# Patient Record
Sex: FEMALE | Race: WHITE | URBAN - METROPOLITAN AREA
[De-identification: names, ages, dates, MRNs, and addresses within clinical notes are randomized per-mention and may not be internally consistent; named-entity substitution may affect disease eponyms.]

---

## 2020-09-01 ENCOUNTER — OFFICE VISIT (OUTPATIENT)
Dept: URBAN - METROPOLITAN AREA CLINIC 19 | Facility: CLINIC | Age: 65
End: 2020-09-01
Payer: MEDICARE

## 2020-09-01 DIAGNOSIS — K51.20 ULCERATIVE PROCTITIS WITHOUT COMPLICATION: ICD-10-CM

## 2020-09-01 PROCEDURE — G8427 DOCREV CUR MEDS BY ELIG CLIN: HCPCS | Performed by: INTERNAL MEDICINE

## 2020-09-01 PROCEDURE — G9903 PT SCRN TBCO ID AS NON USER: HCPCS | Performed by: INTERNAL MEDICINE

## 2020-09-01 PROCEDURE — 3017F COLORECTAL CA SCREEN DOC REV: CPT | Performed by: INTERNAL MEDICINE

## 2020-09-01 PROCEDURE — 1036F TOBACCO NON-USER: CPT | Performed by: INTERNAL MEDICINE

## 2020-09-01 PROCEDURE — 99213 OFFICE O/P EST LOW 20 MIN: CPT | Performed by: INTERNAL MEDICINE

## 2020-09-01 PROCEDURE — G8420 CALC BMI NORM PARAMETERS: HCPCS | Performed by: INTERNAL MEDICINE

## 2020-09-01 PROCEDURE — 82306 VITAMIN D 25 HYDROXY: CPT | Performed by: INTERNAL MEDICINE

## 2020-09-01 RX ORDER — SPIRONOLACTONE 50 MG/1
TABLET, FILM COATED ORAL
Qty: 0 | Refills: 0 | Status: ACTIVE | COMMUNITY
Start: 1900-01-01

## 2020-09-01 RX ORDER — MESALAMINE 1.2 G/1
TAKE 4 TABLETS (4.8 GRAM) BY ORAL ROUTE ONCE DAILY WITH A MEAL FOR 90 DAYS TABLET, DELAYED RELEASE ORAL 1
Qty: 360 | Refills: 3 | Status: ACTIVE | COMMUNITY
Start: 2020-04-23 | End: 2021-04-18

## 2020-09-01 RX ORDER — MESALAMINE 1.2 G/1
4 CAPSULES TABLET, DELAYED RELEASE ORAL ONCE A DAY
Qty: 360 CAPSULE | Refills: 3
Start: 2020-01-08 | End: 2021-01-02

## 2020-09-01 RX ORDER — METOPROLOL TARTRATE 50 MG/1
TABLET, FILM COATED ORAL
Qty: 0 | Refills: 0 | Status: ACTIVE | COMMUNITY
Start: 1900-01-01

## 2020-09-01 RX ORDER — ZOLPIDEM TARTRATE 5 MG/1
TABLET, FILM COATED ORAL
Qty: 0 | Refills: 0 | Status: ACTIVE | COMMUNITY
Start: 1900-01-01

## 2020-09-01 RX ORDER — MESALAMINE 1.2 G/1
TAKE 4 TABLETS BY ORAL ROUTE DAILY FOR 90 DAYS TABLET, DELAYED RELEASE ORAL 1
Qty: 360 | Refills: 3 | Status: ACTIVE | COMMUNITY
Start: 2020-01-08 | End: 2021-01-02

## 2020-09-01 NOTE — HPI-TODAY'S VISIT:
9/5/19: Patient has had intermittent rectal bleeding, so she saw Dr. Lenny Pisano, who performed a colonoscopy on 7/5/19. This revealed inflammation of the rectum, melanosis coli, and small internal hemorrhoids. Biopsies confirmed the presence of chronic, active inflammation consistent with ulcerative proctitis. She was prescribed mesalamine 1.2 g (4 capsules daily = 4.8 g) when she saw Dr. Pisano in clinic on 7/23/19. Now she is constipated - no further diarrhea or blood in stool. She has some vague rectal discomfort. We discussed the natural history of the disease and its management today.   3/5/20: Patient had been doing relatively well on Lialda 2.4 g/day; however, she recently had to increase her Lialda to 4.8 g/day due to increase bowel frequency and abdominal cramping. No blood in stool. She usually has 1-2 BMs/day now but occasionally has 5 to 6 BMs/day. We talked about other treatment options for ulcerative proctitis today, including immunosuppressants and biologics.

## 2020-09-02 LAB
A/G RATIO: 2.6
ALBUMIN: 4.9
ALKALINE PHOSPHATASE: 87
ALT (SGPT): 15
AST (SGOT): 16
BILIRUBIN, TOTAL: 0.3
BUN/CREATININE RATIO: 20
BUN: 17
C-REACTIVE PROTEIN, QUANT: 1
CALCIUM: 10.5
CARBON DIOXIDE, TOTAL: 25
CHLORIDE: 99
CREATININE: 0.86
EGFR IF AFRICN AM: 82
EGFR IF NONAFRICN AM: 71
GLOBULIN, TOTAL: 1.9
GLUCOSE: 87
HEMATOCRIT: 43.1
HEMOGLOBIN: 14.5
MCH: 30.1
MCHC: 33.6
MCV: 89
NRBC: (no result)
PLATELETS: 251
POTASSIUM: 4.7
PROTEIN, TOTAL: 6.8
RBC: 4.82
RDW: 12.6
SEDIMENTATION RATE-WESTERGREN: 7
SODIUM: 140
VITAMIN D, 25-HYDROXY: 98.6
WBC: 6.8

## 2021-03-02 ENCOUNTER — OFFICE VISIT (OUTPATIENT)
Dept: URBAN - METROPOLITAN AREA CLINIC 19 | Facility: CLINIC | Age: 66
End: 2021-03-02
Payer: MEDICARE

## 2021-03-02 ENCOUNTER — LAB OUTSIDE AN ENCOUNTER (OUTPATIENT)
Dept: URBAN - METROPOLITAN AREA CLINIC 19 | Facility: CLINIC | Age: 66
End: 2021-03-02

## 2021-03-02 VITALS
DIASTOLIC BLOOD PRESSURE: 71 MMHG | TEMPERATURE: 97.4 F | SYSTOLIC BLOOD PRESSURE: 149 MMHG | WEIGHT: 132.4 LBS | HEIGHT: 67 IN | BODY MASS INDEX: 20.78 KG/M2 | HEART RATE: 54 BPM

## 2021-03-02 DIAGNOSIS — K51.20 ULCERATIVE PROCTITIS WITHOUT COMPLICATION: ICD-10-CM

## 2021-03-02 PROCEDURE — 99213 OFFICE O/P EST LOW 20 MIN: CPT | Performed by: INTERNAL MEDICINE

## 2021-03-02 RX ORDER — METOPROLOL TARTRATE 50 MG/1
TABLET, FILM COATED ORAL
Qty: 0 | Refills: 0 | Status: ACTIVE | COMMUNITY
Start: 1900-01-01

## 2021-03-02 RX ORDER — SODIUM, POTASSIUM,MAG SULFATES 17.5-3.13G
254 ML SOLUTION, RECONSTITUTED, ORAL ORAL ONCE
Qty: 1 KIT | Refills: 0 | OUTPATIENT
Start: 2021-03-02 | End: 2021-03-03

## 2021-03-02 RX ORDER — ZOLPIDEM TARTRATE 5 MG/1
TABLET, FILM COATED ORAL
Qty: 0 | Refills: 0 | Status: ACTIVE | COMMUNITY
Start: 1900-01-01

## 2021-03-02 RX ORDER — MESALAMINE 1.2 G/1
TAKE 4 TABLETS (4.8 GRAM) BY ORAL ROUTE ONCE DAILY WITH A MEAL FOR 90 DAYS TABLET, DELAYED RELEASE ORAL 1
Qty: 360 | Refills: 3 | Status: ACTIVE | COMMUNITY
Start: 2020-04-23 | End: 2021-04-18

## 2021-03-02 RX ORDER — SPIRONOLACTONE 50 MG/1
TABLET, FILM COATED ORAL
Qty: 0 | Refills: 0 | Status: ACTIVE | COMMUNITY
Start: 1900-01-01

## 2021-03-02 NOTE — HPI-TODAY'S VISIT:
9/5/19: Patient has had intermittent rectal bleeding, so she saw Dr. Lenny Pisano, who performed a colonoscopy on 7/5/19. This revealed inflammation of the rectum, melanosis coli, and small internal hemorrhoids. Biopsies confirmed the presence of chronic, active inflammation consistent with ulcerative proctitis. She was prescribed mesalamine 1.2 g (4 capsules daily = 4.8 g) when she saw Dr. Pisano in clinic on 7/23/19. Now she is constipated - no further diarrhea or blood in stool. She has some vague rectal discomfort. We discussed the natural history of the disease and its management today.   3/5/20: Patient had been doing relatively well on Lialda 2.4 g/day; however, she recently had to increase her Lialda to 4.8 g/day due to increase bowel frequency and abdominal cramping. No blood in stool. She usually has 1-2 BMs/day now but occasionally has 5 to 6 BMs/day. We talked about other treatment options for ulcerative proctitis today, including immunosuppressants and biologics.  3/2/21:  Patient feels well today.  On Lialda 4.8 g/day with good results.  Tried titrating down, but then she would have LLQ pain and bloating.  No blood in stool.  Has never had a colonoscopy by me - agreed to have one this year.

## 2021-03-03 LAB
A/G RATIO: 2.4
ALBUMIN: 4.8
ALKALINE PHOSPHATASE: 92
ALT (SGPT): 10
AST (SGOT): 15
BILIRUBIN, TOTAL: 0.4
BUN/CREATININE RATIO: 17
BUN: 14
C-REACTIVE PROTEIN, QUANT: 1
CALCIUM: 10.3
CARBON DIOXIDE, TOTAL: 27
CHLORIDE: 100
CREATININE: 0.82
EGFR IF AFRICN AM: 86
EGFR IF NONAFRICN AM: 75
GLOBULIN, TOTAL: 2
GLUCOSE: 83
HEMATOCRIT: 42.5
HEMOGLOBIN: 14.5
MCH: 31.6
MCHC: 34.1
MCV: 93
NRBC: (no result)
PLATELETS: 262
POTASSIUM: 4.9
PROTEIN, TOTAL: 6.8
RBC: 4.59
RDW: 12.1
SODIUM: 140
VITAMIN B12: 651
VITAMIN D, 25-HYDROXY: 78.4
WBC: 7.5

## 2021-03-15 PROBLEM — 64766004 ULCERATIVE COLITIS: Status: ACTIVE | Noted: 2021-03-15

## 2021-04-12 ENCOUNTER — TELEPHONE ENCOUNTER (OUTPATIENT)
Dept: URBAN - METROPOLITAN AREA CLINIC 19 | Facility: CLINIC | Age: 66
End: 2021-04-12

## 2021-04-12 ENCOUNTER — OFFICE VISIT (OUTPATIENT)
Dept: URBAN - METROPOLITAN AREA LAB 2 | Facility: LAB | Age: 66
End: 2021-04-12
Payer: MEDICARE

## 2021-04-12 DIAGNOSIS — D12.5 ADENOMA OF SIGMOID COLON: ICD-10-CM

## 2021-04-12 DIAGNOSIS — D12.3 ADENOMA OF TRANSVERSE COLON: ICD-10-CM

## 2021-04-12 DIAGNOSIS — D12.2 ADENOMA OF ASCENDING COLON: ICD-10-CM

## 2021-04-12 DIAGNOSIS — K63.89 BACTERIAL OVERGROWTH SYNDROME: ICD-10-CM

## 2021-04-12 DIAGNOSIS — K51.20 CHRONIC ULCERATIVE PROCTITIS: ICD-10-CM

## 2021-04-12 PROCEDURE — 45385 COLONOSCOPY W/LESION REMOVAL: CPT | Performed by: INTERNAL MEDICINE

## 2021-04-12 PROCEDURE — 45380 COLONOSCOPY AND BIOPSY: CPT | Performed by: INTERNAL MEDICINE

## 2021-04-12 RX ORDER — MESALAMINE 1.2 G/1
TAKE 4 TABLETS (4.8 GRAM) BY ORAL ROUTE ONCE DAILY WITH A MEAL FOR 90 DAYS TABLET, DELAYED RELEASE ORAL 1
Qty: 360 | Refills: 3 | Status: ACTIVE | COMMUNITY
Start: 2020-04-23 | End: 2021-04-18

## 2021-04-12 RX ORDER — SPIRONOLACTONE 50 MG/1
TABLET, FILM COATED ORAL
Qty: 0 | Refills: 0 | Status: ACTIVE | COMMUNITY
Start: 1900-01-01

## 2021-04-12 RX ORDER — ZOLPIDEM TARTRATE 5 MG/1
TABLET, FILM COATED ORAL
Qty: 0 | Refills: 0 | Status: ACTIVE | COMMUNITY
Start: 1900-01-01

## 2021-04-12 RX ORDER — METOPROLOL TARTRATE 50 MG/1
TABLET, FILM COATED ORAL
Qty: 0 | Refills: 0 | Status: ACTIVE | COMMUNITY
Start: 1900-01-01

## 2021-06-09 ENCOUNTER — OFFICE VISIT (OUTPATIENT)
Dept: URBAN - METROPOLITAN AREA CLINIC 19 | Facility: CLINIC | Age: 66
End: 2021-06-09
Payer: MEDICARE

## 2021-06-09 VITALS
DIASTOLIC BLOOD PRESSURE: 75 MMHG | HEART RATE: 68 BPM | BODY MASS INDEX: 20.03 KG/M2 | HEIGHT: 67 IN | WEIGHT: 127.6 LBS | SYSTOLIC BLOOD PRESSURE: 118 MMHG | TEMPERATURE: 98.2 F

## 2021-06-09 DIAGNOSIS — K51.20 ULCERATIVE PROCTITIS: ICD-10-CM

## 2021-06-09 DIAGNOSIS — Z86.010 HISTORY OF COLON POLYPS: ICD-10-CM

## 2021-06-09 PROBLEM — 428283002 HISTORY OF POLYP OF COLON: Status: ACTIVE | Noted: 2021-06-09

## 2021-06-09 PROCEDURE — 99213 OFFICE O/P EST LOW 20 MIN: CPT | Performed by: INTERNAL MEDICINE

## 2021-06-09 RX ORDER — SPIRONOLACTONE 50 MG/1
TABLET, FILM COATED ORAL
Qty: 0 | Refills: 0 | Status: ACTIVE | COMMUNITY
Start: 1900-01-01

## 2021-06-09 RX ORDER — MESALAMINE 1.2 G/1
4 TABLETS TABLET, DELAYED RELEASE ORAL ONCE A DAY
Qty: 360 TABLET | Refills: 3 | OUTPATIENT
Start: 2021-06-09 | End: 2022-06-04

## 2021-06-09 RX ORDER — METOPROLOL TARTRATE 50 MG/1
TABLET, FILM COATED ORAL
Qty: 0 | Refills: 0 | Status: ACTIVE | COMMUNITY
Start: 1900-01-01

## 2021-06-09 RX ORDER — ZOLPIDEM TARTRATE 5 MG/1
TABLET, FILM COATED ORAL
Qty: 0 | Refills: 0 | Status: ACTIVE | COMMUNITY
Start: 1900-01-01

## 2021-06-09 NOTE — HPI-TODAY'S VISIT:
9/5/19: Patient has had intermittent rectal bleeding, so she saw Dr. Lenny Pisano, who performed a colonoscopy on 7/5/19. This revealed inflammation of the rectum, melanosis coli, and small internal hemorrhoids. Biopsies confirmed the presence of chronic, active inflammation consistent with ulcerative proctitis. She was prescribed mesalamine 1.2 g (4 capsules daily = 4.8 g) when she saw Dr. Pisano in clinic on 7/23/19. Now she is constipated - no further diarrhea or blood in stool. She has some vague rectal discomfort. We discussed the natural history of the disease and its management today.   3/5/20: Patient had been doing relatively well on Lialda 2.4 g/day; however, she recently had to increase her Lialda to 4.8 g/day due to increase bowel frequency and abdominal cramping. No blood in stool. She usually has 1-2 BMs/day now but occasionally has 5 to 6 BMs/day. We talked about other treatment options for ulcerative proctitis today, including immunosuppressants and biologics.  3/2/21:  Patient feels well today.  On Lialda 4.8 g/day with good results.  Tried titrating down, but then she would have LLQ pain and bloating.  No blood in stool.  Has never had a colonoscopy by me - agreed to have one this year.  6/9/21:  Patient presents for follow up after I repeated her colonoscopy on 4/12/21.  This showed 6 polyps, some of which were sessile serrated adenomas.  She had some erythematous mucosa in the right colon, but biopsies showed only hemorrhage, not inflammation.  Biopsies from the rectum were unremarkable.  Patient feels fine today - no abdominal pain, diarrhea, or rectal bleeding.

## 2021-09-07 ENCOUNTER — OFFICE VISIT (OUTPATIENT)
Dept: URBAN - METROPOLITAN AREA CLINIC 19 | Facility: CLINIC | Age: 66
End: 2021-09-07

## 2022-05-16 ENCOUNTER — WEB ENCOUNTER (OUTPATIENT)
Dept: URBAN - METROPOLITAN AREA CLINIC 19 | Facility: CLINIC | Age: 67
End: 2022-05-16

## 2022-05-16 ENCOUNTER — OFFICE VISIT (OUTPATIENT)
Dept: URBAN - METROPOLITAN AREA CLINIC 19 | Facility: CLINIC | Age: 67
End: 2022-05-16
Payer: MEDICARE

## 2022-05-16 DIAGNOSIS — K51.20 ULCERATIVE PROCTITIS: ICD-10-CM

## 2022-05-16 PROCEDURE — 99214 OFFICE O/P EST MOD 30 MIN: CPT | Performed by: INTERNAL MEDICINE

## 2022-05-16 RX ORDER — METOPROLOL TARTRATE 50 MG/1
TABLET, FILM COATED ORAL
Qty: 0 | Refills: 0 | Status: ACTIVE | COMMUNITY
Start: 1900-01-01

## 2022-05-16 RX ORDER — ZOLPIDEM TARTRATE 5 MG/1
TABLET, FILM COATED ORAL
Qty: 0 | Refills: 0 | Status: ACTIVE | COMMUNITY
Start: 1900-01-01

## 2022-05-16 RX ORDER — SPIRONOLACTONE 50 MG/1
TABLET, FILM COATED ORAL
Qty: 0 | Refills: 0 | Status: ACTIVE | COMMUNITY
Start: 1900-01-01

## 2022-05-16 RX ORDER — MESALAMINE 1.2 G/1
4 TABLETS TABLET, DELAYED RELEASE ORAL ONCE A DAY
Qty: 360 | Refills: 3
Start: 2021-06-09 | End: 2023-05-11

## 2022-05-16 RX ORDER — MESALAMINE 1.2 G/1
4 TABLETS TABLET, DELAYED RELEASE ORAL ONCE A DAY
Qty: 360 TABLET | Refills: 3 | Status: ACTIVE | COMMUNITY
Start: 2021-06-09 | End: 2022-06-04

## 2022-05-16 NOTE — HPI-TODAY'S VISIT:
9/5/19: Patient has had intermittent rectal bleeding, so she saw Dr. Lenny Pisano, who performed a colonoscopy on 7/5/19. This revealed inflammation of the rectum, melanosis coli, and small internal hemorrhoids. Biopsies confirmed the presence of chronic, active inflammation consistent with ulcerative proctitis. She was prescribed mesalamine 1.2 g (4 capsules daily = 4.8 g) when she saw Dr. Pisano in clinic on 7/23/19. Now she is constipated - no further diarrhea or blood in stool. She has some vague rectal discomfort. We discussed the natural history of the disease and its management today.   3/5/20: Patient had been doing relatively well on Lialda 2.4 g/day; however, she recently had to increase her Lialda to 4.8 g/day due to increase bowel frequency and abdominal cramping. No blood in stool. She usually has 1-2 BMs/day now but occasionally has 5 to 6 BMs/day. We talked about other treatment options for ulcerative proctitis today, including immunosuppressants and biologics.  3/2/21:  Patient feels well today.  On Lialda 4.8 g/day with good results.  Tried titrating down, but then she would have LLQ pain and bloating.  No blood in stool.  Has never had a colonoscopy by me - agreed to have one this year.  6/9/21:  Patient presents for follow up after I repeated her colonoscopy on 4/12/21.  This showed 6 polyps, some of which were sessile serrated adenomas.  She had some erythematous mucosa in the right colon, but biopsies showed only hemorrhage, not inflammation.  Biopsies from the rectum were unremarkable.  Patient feels fine today - no abdominal pain, diarrhea, or rectal bleeding.  5/16/22:  Patient presents for follow-up of her ulcerative proctitis.  She has not managed to decrease her Lialda to 2 capsules daily, but she has decreased them to 3 capsules daily.  If she goes down to 2 capsules, she gets bloating/cramping.  Today, she has no complaints.  We agreed that I should repeat her colonoscopy to reevaluate her colon

## 2022-05-17 LAB
A/G RATIO: 2.5
ALBUMIN: 4.8
ALKALINE PHOSPHATASE: 95
ALT (SGPT): 16
AST (SGOT): 18
BILIRUBIN, TOTAL: 0.4
BUN/CREATININE RATIO: 22
BUN: 16
C-REACTIVE PROTEIN, QUANT: 1
CALCIUM: 10.2
CARBON DIOXIDE, TOTAL: 23
CHLORIDE: 100
CREATININE: 0.72
EGFR: 92
GLOBULIN, TOTAL: 1.9
GLUCOSE: 96
HEMATOCRIT: 45.1
HEMOGLOBIN: 14.2
MCH: 29.6
MCHC: 31.5
MCV: 94
NRBC: (no result)
PLATELETS: 268
POTASSIUM: 4.8
PROTEIN, TOTAL: 6.7
RBC: 4.8
RDW: 12.1
SEDIMENTATION RATE-WESTERGREN: 2
SODIUM: 141
VITAMIN B12: 503
VITAMIN D, 25-HYDROXY: 66.7
WBC: 10.7

## 2022-06-07 ENCOUNTER — OFFICE VISIT (OUTPATIENT)
Dept: URBAN - METROPOLITAN AREA CLINIC 19 | Facility: CLINIC | Age: 67
End: 2022-06-07

## 2023-05-19 ENCOUNTER — LAB OUTSIDE AN ENCOUNTER (OUTPATIENT)
Dept: URBAN - METROPOLITAN AREA CLINIC 19 | Facility: CLINIC | Age: 68
End: 2023-05-19

## 2023-05-19 ENCOUNTER — OFFICE VISIT (OUTPATIENT)
Dept: URBAN - METROPOLITAN AREA CLINIC 19 | Facility: CLINIC | Age: 68
End: 2023-05-19
Payer: MEDICARE

## 2023-05-19 ENCOUNTER — DASHBOARD ENCOUNTERS (OUTPATIENT)
Age: 68
End: 2023-05-19

## 2023-05-19 VITALS
WEIGHT: 132.6 LBS | HEIGHT: 67 IN | TEMPERATURE: 98.8 F | DIASTOLIC BLOOD PRESSURE: 64 MMHG | BODY MASS INDEX: 20.81 KG/M2 | SYSTOLIC BLOOD PRESSURE: 132 MMHG

## 2023-05-19 DIAGNOSIS — R19.8 ALTERNATING CONSTIPATION AND DIARRHEA: ICD-10-CM

## 2023-05-19 DIAGNOSIS — R13.19 ESOPHAGEAL DYSPHAGIA: ICD-10-CM

## 2023-05-19 DIAGNOSIS — K51.20 ULCERATIVE PROCTITIS: ICD-10-CM

## 2023-05-19 DIAGNOSIS — Z86.010 HISTORY OF COLON POLYPS: ICD-10-CM

## 2023-05-19 DIAGNOSIS — R13.12 OROPHARYNGEAL DYSPHAGIA: ICD-10-CM

## 2023-05-19 DIAGNOSIS — R12 HEARTBURN: ICD-10-CM

## 2023-05-19 PROBLEM — 71457002: Status: ACTIVE | Noted: 2023-05-19

## 2023-05-19 PROCEDURE — 99214 OFFICE O/P EST MOD 30 MIN: CPT | Performed by: INTERNAL MEDICINE

## 2023-05-19 RX ORDER — SPIRONOLACTONE 50 MG/1
TABLET, FILM COATED ORAL
Qty: 0 | Refills: 0 | Status: ACTIVE | COMMUNITY
Start: 1900-01-01

## 2023-05-19 RX ORDER — SODIUM, POTASSIUM,MAG SULFATES 17.5-3.13G
177ML SOLUTION, RECONSTITUTED, ORAL ORAL
Qty: 1 | OUTPATIENT
Start: 2023-05-19 | End: 2023-05-21

## 2023-05-19 RX ORDER — MESALAMINE 1.2 G/1
4 TABLETS TABLET, DELAYED RELEASE ORAL ONCE A DAY
Qty: 360 | Refills: 3
Start: 2021-06-09 | End: 2024-05-13

## 2023-05-19 RX ORDER — METOPROLOL TARTRATE 50 MG/1
TABLET, FILM COATED ORAL
Qty: 0 | Refills: 0 | Status: ACTIVE | COMMUNITY
Start: 1900-01-01

## 2023-05-19 RX ORDER — ZOLPIDEM TARTRATE 5 MG/1
TABLET, FILM COATED ORAL
Qty: 0 | Refills: 0 | Status: ACTIVE | COMMUNITY
Start: 1900-01-01

## 2023-05-19 NOTE — HPI-TODAY'S VISIT:
9/5/19: Patient has had intermittent rectal bleeding, so she saw Dr. Lenny Pisano, who performed a colonoscopy on 7/5/19. This revealed inflammation of the rectum, melanosis coli, and small internal hemorrhoids. Biopsies confirmed the presence of chronic, active inflammation consistent with ulcerative proctitis. She was prescribed mesalamine 1.2 g (4 capsules daily = 4.8 g) when she saw Dr. Pisano in clinic on 7/23/19. Now she is constipated - no further diarrhea or blood in stool. She has some vague rectal discomfort. We discussed the natural history of the disease and its management today.   3/5/20: Patient had been doing relatively well on Lialda 2.4 g/day; however, she recently had to increase her Lialda to 4.8 g/day due to increase bowel frequency and abdominal cramping. No blood in stool. She usually has 1-2 BMs/day now but occasionally has 5 to 6 BMs/day. We talked about other treatment options for ulcerative proctitis today, including immunosuppressants and biologics.  3/2/21:  Patient feels well today.  On Lialda 4.8 g/day with good results.  Tried titrating down, but then she would have LLQ pain and bloating.  No blood in stool.  Has never had a colonoscopy by me - agreed to have one this year.  6/9/21:  Patient presents for follow up after I repeated her colonoscopy on 4/12/21.  This showed 6 polyps, some of which were sessile serrated adenomas.  She had some erythematous mucosa in the right colon, but biopsies showed only hemorrhage, not inflammation.  Biopsies from the rectum were unremarkable.  Patient feels fine today - no abdominal pain, diarrhea, or rectal bleeding.  5/16/22:  Patient presents for follow-up of her ulcerative proctitis.  She has not managed to decrease her Lialda to 2 capsules daily, but she has decreased them to 3 capsules daily.  If she goes down to 2 capsules, she gets bloating/cramping.  Today, she has no complaints.  We agreed that I should repeat her colonoscopy to reevaluate her colon.  5/19/23: Patient returns for reevaluation of her ulcerative proctitis. I performed her last colonoscopy on 5/16/22 and found evidence of quiescent ulcerative proctitis. She takes Lialda 3.6 g/day since she states that this is the dose that prevents her from having diarrhea or constipation. She states that even at this dose, she has occasional fecal impactions - she takes MiraLax and senna occasionally. She also complains of some dysphagia - she states that she sometimes get "choked" with food and feels a "tightness" in her upper abdomen.

## 2023-05-21 LAB
A/G RATIO: 2.1
ALBUMIN: 4.6
ALKALINE PHOSPHATASE: 81
ALT (SGPT): 11
AST (SGOT): 13
BILIRUBIN, TOTAL: 0.4
BUN/CREATININE RATIO: (no result)
BUN: 16
C-REACTIVE PROTEIN, QUANT: 1.9
CALCIUM: 10
CARBON DIOXIDE, TOTAL: 28
CHLORIDE: 105
CREATININE: 0.75
EGFR: 87
GLOBULIN, TOTAL: 2.2
GLUCOSE: 71
HEMATOCRIT: 42.4
HEMOGLOBIN: 14.8
MCH: 30.7
MCHC: 34.9
MCV: 88
MPV: 10.5
PLATELET COUNT: 259
POTASSIUM: 4.6
PROTEIN, TOTAL: 6.8
RDW: 11.8
RED BLOOD CELL COUNT: 4.82
SED RATE BY MODIFIED: 2
SODIUM: 140
VITAMIN B12: 376
VITAMIN D,25-OH,TOTAL,IA: 67
WHITE BLOOD CELL COUNT: 9.4

## 2023-05-31 ENCOUNTER — ERX REFILL RESPONSE (OUTPATIENT)
Dept: URBAN - METROPOLITAN AREA CLINIC 19 | Facility: CLINIC | Age: 68
End: 2023-05-31

## 2023-05-31 RX ORDER — MESALAMINE 1.2 G/1
4 TABLETS TABLET, DELAYED RELEASE ORAL ONCE A DAY
Qty: 360 | Refills: 3 | OUTPATIENT

## 2023-05-31 RX ORDER — MESALAMINE 1.2 G/1
TAKE 4 TABLETS BY MOUTH ONCE A DAY TABLET, DELAYED RELEASE ORAL
Qty: 360 TABLET | Refills: 3 | OUTPATIENT

## 2023-07-24 ENCOUNTER — OFFICE VISIT (OUTPATIENT)
Dept: URBAN - METROPOLITAN AREA SURGERY CENTER 31 | Facility: SURGERY CENTER | Age: 68
End: 2023-07-24
Payer: MEDICARE

## 2023-07-24 ENCOUNTER — CLAIMS CREATED FROM THE CLAIM WINDOW (OUTPATIENT)
Dept: URBAN - METROPOLITAN AREA CLINIC 4 | Facility: CLINIC | Age: 68
End: 2023-07-24
Payer: MEDICARE

## 2023-07-24 DIAGNOSIS — K51.20 ACUTE ULCERATIVE PROCTITIS: ICD-10-CM

## 2023-07-24 DIAGNOSIS — D12.0 ADENOMA OF CECUM: ICD-10-CM

## 2023-07-24 DIAGNOSIS — R13.10 ABNORMAL DEGLUTITION: ICD-10-CM

## 2023-07-24 DIAGNOSIS — K31.89 ACQUIRED DEFORMITY OF DUODENUM: ICD-10-CM

## 2023-07-24 DIAGNOSIS — K29.70 GASTRITIS, UNSPECIFIED, WITHOUT BLEEDING: ICD-10-CM

## 2023-07-24 DIAGNOSIS — K63.89 APPENDICITIS EPIPLOICA: ICD-10-CM

## 2023-07-24 PROCEDURE — 88305 TISSUE EXAM BY PATHOLOGIST: CPT | Performed by: PATHOLOGY

## 2023-07-24 PROCEDURE — 43239 EGD BIOPSY SINGLE/MULTIPLE: CPT | Performed by: INTERNAL MEDICINE

## 2023-07-24 PROCEDURE — G8907 PT DOC NO EVENTS ON DISCHARG: HCPCS | Performed by: INTERNAL MEDICINE

## 2023-07-24 PROCEDURE — 45385 COLONOSCOPY W/LESION REMOVAL: CPT | Performed by: INTERNAL MEDICINE

## 2023-07-24 PROCEDURE — 43248 EGD GUIDE WIRE INSERTION: CPT | Performed by: INTERNAL MEDICINE

## 2023-07-24 PROCEDURE — 45380 COLONOSCOPY AND BIOPSY: CPT | Performed by: INTERNAL MEDICINE

## 2023-07-24 PROCEDURE — 88342 IMHCHEM/IMCYTCHM 1ST ANTB: CPT | Performed by: PATHOLOGY

## 2023-07-24 RX ORDER — METOPROLOL TARTRATE 50 MG/1
TABLET, FILM COATED ORAL
Qty: 0 | Refills: 0 | Status: ACTIVE | COMMUNITY
Start: 1900-01-01

## 2023-07-24 RX ORDER — SPIRONOLACTONE 50 MG/1
TABLET, FILM COATED ORAL
Qty: 0 | Refills: 0 | Status: ACTIVE | COMMUNITY
Start: 1900-01-01

## 2023-07-24 RX ORDER — ZOLPIDEM TARTRATE 5 MG/1
TABLET, FILM COATED ORAL
Qty: 0 | Refills: 0 | Status: ACTIVE | COMMUNITY
Start: 1900-01-01

## 2023-07-24 RX ORDER — MESALAMINE 1.2 G/1
TAKE 4 TABLETS BY MOUTH ONCE A DAY TABLET, DELAYED RELEASE ORAL
Qty: 360 TABLET | Refills: 3 | Status: ACTIVE | COMMUNITY

## 2024-06-03 ENCOUNTER — OFFICE VISIT (OUTPATIENT)
Dept: URBAN - METROPOLITAN AREA CLINIC 19 | Facility: CLINIC | Age: 69
End: 2024-06-03

## 2024-06-04 ENCOUNTER — OFFICE VISIT (OUTPATIENT)
Dept: URBAN - METROPOLITAN AREA CLINIC 19 | Facility: CLINIC | Age: 69
End: 2024-06-04
Payer: MEDICARE

## 2024-06-04 VITALS
TEMPERATURE: 98.3 F | BODY MASS INDEX: 20.56 KG/M2 | DIASTOLIC BLOOD PRESSURE: 74 MMHG | SYSTOLIC BLOOD PRESSURE: 116 MMHG | HEART RATE: 57 BPM | WEIGHT: 131 LBS | HEIGHT: 67 IN

## 2024-06-04 DIAGNOSIS — K51.20 ULCERATIVE PROCTITIS: ICD-10-CM

## 2024-06-04 DIAGNOSIS — R13.12 OROPHARYNGEAL DYSPHAGIA: ICD-10-CM

## 2024-06-04 DIAGNOSIS — R13.19 ESOPHAGEAL DYSPHAGIA: ICD-10-CM

## 2024-06-04 PROCEDURE — 99214 OFFICE O/P EST MOD 30 MIN: CPT | Performed by: INTERNAL MEDICINE

## 2024-06-04 RX ORDER — METOPROLOL TARTRATE 50 MG/1
TABLET, FILM COATED ORAL
Qty: 0 | Refills: 0 | Status: ACTIVE | COMMUNITY
Start: 1900-01-01

## 2024-06-04 RX ORDER — ZOLPIDEM TARTRATE 5 MG/1
TABLET, FILM COATED ORAL
Qty: 0 | Refills: 0 | Status: ACTIVE | COMMUNITY
Start: 1900-01-01

## 2024-06-04 RX ORDER — MESALAMINE 1.2 G/1
TAKE 4 TABLETS BY MOUTH ONCE A DAY TABLET, DELAYED RELEASE ORAL
Qty: 360 TABLET | Refills: 3 | Status: ACTIVE | COMMUNITY

## 2024-06-04 RX ORDER — MESALAMINE 1.2 G/1
2 TABLETS WITH A MEAL TABLET, DELAYED RELEASE ORAL ONCE A DAY
Qty: 180 TABLET | Refills: 3

## 2024-06-04 RX ORDER — SPIRONOLACTONE 50 MG/1
TABLET, FILM COATED ORAL
Qty: 0 | Refills: 0 | Status: ACTIVE | COMMUNITY
Start: 1900-01-01

## 2024-06-05 LAB
A/G RATIO: 2.4
ALBUMIN: 4.5
ALKALINE PHOSPHATASE: 83
ALT (SGPT): 12
AST (SGOT): 15
BILIRUBIN, TOTAL: 0.5
BUN/CREATININE RATIO: (no result)
BUN: 14
C-REACTIVE PROTEIN, QUANT: <3
CALCIUM: 10.1
CARBON DIOXIDE, TOTAL: 27
CHLORIDE: 104
CREATININE: 0.79
EGFR: 81
GLOBULIN, TOTAL: 1.9
GLUCOSE: 59
HEMATOCRIT: 42.5
HEMOGLOBIN: 14.1
MCH: 30.4
MCHC: 33.2
MCV: 91.6
MPV: 10
PLATELET COUNT: 308
POTASSIUM: 4.6
PROTEIN, TOTAL: 6.4
RDW: 12.4
RED BLOOD CELL COUNT: 4.64
SED RATE BY MODIFIED: 6
SODIUM: 141
VITAMIN D,25-OH,TOTAL,IA: 68
WHITE BLOOD CELL COUNT: 10.1

## 2025-06-05 ENCOUNTER — OFFICE VISIT (OUTPATIENT)
Dept: URBAN - METROPOLITAN AREA CLINIC 19 | Facility: CLINIC | Age: 70
End: 2025-06-05

## 2025-06-06 ENCOUNTER — OFFICE VISIT (OUTPATIENT)
Dept: URBAN - METROPOLITAN AREA CLINIC 19 | Facility: CLINIC | Age: 70
End: 2025-06-06

## 2025-06-10 ENCOUNTER — LAB OUTSIDE AN ENCOUNTER (OUTPATIENT)
Dept: URBAN - METROPOLITAN AREA CLINIC 19 | Facility: CLINIC | Age: 70
End: 2025-06-10

## 2025-06-10 ENCOUNTER — OFFICE VISIT (OUTPATIENT)
Dept: URBAN - METROPOLITAN AREA CLINIC 19 | Facility: CLINIC | Age: 70
End: 2025-06-10
Payer: MEDICARE

## 2025-06-10 DIAGNOSIS — K51.20 ULCERATIVE PROCTITIS: ICD-10-CM

## 2025-06-10 DIAGNOSIS — R12 HEARTBURN: ICD-10-CM

## 2025-06-10 DIAGNOSIS — Z86.0100 HISTORY OF COLON POLYPS: ICD-10-CM

## 2025-06-10 PROCEDURE — 99214 OFFICE O/P EST MOD 30 MIN: CPT | Performed by: INTERNAL MEDICINE

## 2025-06-10 RX ORDER — ZOLPIDEM TARTRATE 5 MG/1
TABLET, FILM COATED ORAL
Qty: 0 | Refills: 0 | Status: ACTIVE | COMMUNITY
Start: 1900-01-01

## 2025-06-10 RX ORDER — RIVAROXABAN 2.5 MG/1
1 TABLET WITH FOOD TABLET, FILM COATED ORAL
Status: ACTIVE | COMMUNITY

## 2025-06-10 RX ORDER — MESALAMINE 1.2 G/1
2 TABLETS WITH A MEAL TABLET, DELAYED RELEASE ORAL ONCE A DAY
Qty: 180 | Refills: 3

## 2025-06-10 RX ORDER — PANTOPRAZOLE SODIUM 40 MG/1
1 TABLET 1/2 TO 1 HOUR BEFORE MORNING MEAL TABLET, DELAYED RELEASE ORAL ONCE A DAY
Qty: 90 | Refills: 3 | OUTPATIENT
Start: 2025-06-10

## 2025-06-10 RX ORDER — DEUCRAVACITINIB 6 MG/1
1 TABLET TABLET, FILM COATED ORAL
Status: ACTIVE | COMMUNITY

## 2025-06-10 RX ORDER — SPIRONOLACTONE 50 MG/1
TABLET, FILM COATED ORAL
Qty: 0 | Refills: 0 | Status: ACTIVE | COMMUNITY
Start: 1900-01-01

## 2025-06-10 RX ORDER — MESALAMINE 1.2 G/1
2 TABLETS WITH A MEAL TABLET, DELAYED RELEASE ORAL ONCE A DAY
Qty: 180 TABLET | Refills: 3 | Status: ACTIVE | COMMUNITY

## 2025-06-10 RX ORDER — METOPROLOL TARTRATE 50 MG/1
TABLET, FILM COATED ORAL
Qty: 0 | Refills: 0 | Status: ACTIVE | COMMUNITY
Start: 1900-01-01

## 2025-06-10 NOTE — HPI-TODAY'S VISIT:
9/5/19 (Dr. Anders Ruelas): Patient has had intermittent rectal bleeding, so she saw Dr. Lenny Pisano, who performed a colonoscopy on 7/5/19. This revealed inflammation of the rectum, melanosis coli, and small internal hemorrhoids. Biopsies confirmed the presence of chronic, active inflammation consistent with ulcerative proctitis. She was prescribed mesalamine 1.2 g (4 capsules daily = 4.8 g) when she saw Dr. Pisano in clinic on 7/23/19. Now she is constipated - no further diarrhea or blood in stool. She has some vague rectal discomfort. We discussed the natural history of the disease and its management today.   3/5/20 (Dr. Anders Ruelas): Patient had been doing relatively well on Lialda 2.4 g/day; however, she recently had to increase her Lialda to 4.8 g/day due to increase bowel frequency and abdominal cramping. No blood in stool. She usually has 1-2 BMs/day now but occasionally has 5 to 6 BMs/day. We talked about other treatment options for ulcerative proctitis today, including immunosuppressants and biologics.  3/2/21 (Dr. Anders Ruelas):  Patient feels well today.  On Lialda 4.8 g/day with good results.  Tried titrating down, but then she would have LLQ pain and bloating.  No blood in stool.  Has never had a colonoscopy by me - agreed to have one this year.  6/9/21 (Dr. Anders Ruelas):  Patient presents for follow up after I repeated her colonoscopy on 4/12/21.  This showed 6 polyps, some of which were sessile serrated adenomas.  She had some erythematous mucosa in the right colon, but biopsies showed only hemorrhage, not inflammation.  Biopsies from the rectum were unremarkable.  Patient feels fine today - no abdominal pain, diarrhea, or rectal bleeding.  5/16/22 (Dr. Anders Ruelas):  Patient presents for follow-up of her ulcerative proctitis.  She has not managed to decrease her Lialda to 2 capsules daily, but she has decreased them to 3 capsules daily.  If she goes down to 2 capsules, she gets bloating/cramping.  Today, she has no complaints.  We agreed that I should repeat her colonoscopy to reevaluate her colon.  5/19/23 (Dr. Anders Ruelas): Patient returns for reevaluation of her ulcerative proctitis. I performed her last colonoscopy on 5/16/22 and found evidence of quiescent ulcerative proctitis. She takes Lialda 3.6 g/day since she states that this is the dose that prevents her from having diarrhea or constipation. She states that even at this dose, she has occasional fecal impactions - she takes MiraLax and senna occasionally. She also complains of some dysphagia - she states that she sometimes get "choked" with food and feels a "tightness" in her upper abdomen.  6/4/24 (Dr. Anders Ruelas): Patient returns for reevaluation of her ulcerative proctitis and dysphagia issues. After seeing me on 5/19/23, she had a barium esophagram on 6/6/23 that showed "mild esophageal dysmotility" and mild delay in barium tablet passage. I performed an EGD on 7/24/23 that showed no esophageal abnormalities, but I did an empiric dilation with a 54-[employer] Sav[employer] dilator - she has no more dysphagia. She had some mild reactive gastropathy (incidental). Her colonoscopy showed two adenomatous polyps and melanosis, but there was no sign of inflammation, both endoscopically and pathologically. She continues to take Lialda 2.4 g/day without any side effects. She still has intermittent "tightness" in her upper abdomen that last for a few minuts. Very infrequent.  6/10/25 (Dr. Anders Ruelas):  Patient presents for reevaluation of her ulcerative proctitis. Doing well on Lialda 2.4 g daily. She has occasional changes in bowel habits, alternating between mild constipation and loose stools. She sometimes has bloating and heartburn, but she denies dysphagia. Due for surveillance colonoscopy - it has been almost 2 years since her last one.

## 2025-06-11 LAB
A/G RATIO: 2.3
ALBUMIN: 4.5
ALKALINE PHOSPHATASE: 62
ALT (SGPT): 10
AST (SGOT): 13
BILIRUBIN, TOTAL: 0.4
BUN/CREATININE RATIO: (no result)
BUN: 21
C-REACTIVE PROTEIN, QUANT: <3
CALCIUM: 9.9
CARBON DIOXIDE, TOTAL: 28
CHLORIDE: 106
CREATININE: 0.86
EGFR: 73
GLOBULIN, TOTAL: 2
GLUCOSE: 75
HEMATOCRIT: 40.6
HEMOGLOBIN: 13.5
MCH: 30.8
MCHC: 33.3
MCV: 92.5
MPV: 10.7
PLATELET COUNT: 263
POTASSIUM: 4.8
PROTEIN, TOTAL: 6.5
RDW: 11.9
RED BLOOD CELL COUNT: 4.39
SED RATE BY MODIFIED: 6
SODIUM: 142
VITAMIN D,25-OH,TOTAL,IA: 77
WHITE BLOOD CELL COUNT: 11.2